# Patient Record
Sex: MALE | Race: ASIAN | Employment: UNEMPLOYED | URBAN - METROPOLITAN AREA
[De-identification: names, ages, dates, MRNs, and addresses within clinical notes are randomized per-mention and may not be internally consistent; named-entity substitution may affect disease eponyms.]

---

## 2020-07-04 ENCOUNTER — ANESTHESIA (OUTPATIENT)
Dept: OPERATING ROOM | Age: 20
DRG: 234 | End: 2020-07-04
Payer: COMMERCIAL

## 2020-07-04 ENCOUNTER — HOSPITAL ENCOUNTER (INPATIENT)
Age: 20
LOS: 1 days | Discharge: HOME | DRG: 234 | End: 2020-07-05
Attending: EMERGENCY MEDICINE | Admitting: SURGERY
Payer: COMMERCIAL

## 2020-07-04 ENCOUNTER — APPOINTMENT (OUTPATIENT)
Dept: CT IMAGING | Age: 20
DRG: 234 | End: 2020-07-04
Payer: COMMERCIAL

## 2020-07-04 ENCOUNTER — ANESTHESIA EVENT (OUTPATIENT)
Dept: OPERATING ROOM | Age: 20
DRG: 234 | End: 2020-07-04
Payer: COMMERCIAL

## 2020-07-04 VITALS
DIASTOLIC BLOOD PRESSURE: 52 MMHG | SYSTOLIC BLOOD PRESSURE: 110 MMHG | OXYGEN SATURATION: 100 % | RESPIRATION RATE: 19 BRPM

## 2020-07-04 PROBLEM — K35.80 ACUTE APPENDICITIS: Status: ACTIVE | Noted: 2020-07-04

## 2020-07-04 LAB
ALBUMIN SERPL-MCNC: 4.8 G/DL (ref 3.4–5)
ALP BLD-CCNC: 98 U/L (ref 40–129)
ALT SERPL-CCNC: 19 U/L (ref 10–40)
ANION GAP SERPL CALCULATED.3IONS-SCNC: 11 MMOL/L (ref 3–16)
AST SERPL-CCNC: 29 U/L (ref 15–37)
BASOPHILS ABSOLUTE: 0.1 K/UL (ref 0–0.2)
BASOPHILS RELATIVE PERCENT: 0.4 %
BILIRUB SERPL-MCNC: 0.5 MG/DL (ref 0–1)
BILIRUBIN DIRECT: <0.2 MG/DL (ref 0–0.3)
BILIRUBIN URINE: NEGATIVE
BILIRUBIN, INDIRECT: ABNORMAL MG/DL (ref 0–1)
BLOOD, URINE: NEGATIVE
BUN BLDV-MCNC: 16 MG/DL (ref 7–20)
CALCIUM SERPL-MCNC: 9.9 MG/DL (ref 8.3–10.6)
CHLORIDE BLD-SCNC: 99 MMOL/L (ref 99–110)
CLARITY: CLEAR
CO2: 29 MMOL/L (ref 21–32)
COLOR: YELLOW
CREAT SERPL-MCNC: 1 MG/DL (ref 0.9–1.3)
EOSINOPHILS ABSOLUTE: 0.1 K/UL (ref 0–0.6)
EOSINOPHILS RELATIVE PERCENT: 0.8 %
GFR AFRICAN AMERICAN: >60
GFR NON-AFRICAN AMERICAN: >60
GLUCOSE BLD-MCNC: 131 MG/DL (ref 70–99)
GLUCOSE URINE: NEGATIVE MG/DL
HCT VFR BLD CALC: 48.4 % (ref 40.5–52.5)
HEMOGLOBIN: 16.3 G/DL (ref 13.5–17.5)
KETONES, URINE: NEGATIVE MG/DL
LEUKOCYTE ESTERASE, URINE: NEGATIVE
LIPASE: 18 U/L (ref 13–60)
LYMPHOCYTES ABSOLUTE: 1.9 K/UL (ref 1–5.1)
LYMPHOCYTES RELATIVE PERCENT: 13.4 %
MCH RBC QN AUTO: 27.1 PG (ref 26–34)
MCHC RBC AUTO-ENTMCNC: 33.7 G/DL (ref 31–36)
MCV RBC AUTO: 80.4 FL (ref 80–100)
MICROSCOPIC EXAMINATION: NORMAL
MONOCYTES ABSOLUTE: 0.6 K/UL (ref 0–1.3)
MONOCYTES RELATIVE PERCENT: 4.4 %
NEUTROPHILS ABSOLUTE: 11.3 K/UL (ref 1.7–7.7)
NEUTROPHILS RELATIVE PERCENT: 81 %
NITRITE, URINE: NEGATIVE
PDW BLD-RTO: 13.4 % (ref 12.4–15.4)
PH UA: 6 (ref 5–8)
PLATELET # BLD: 280 K/UL (ref 135–450)
PMV BLD AUTO: 9.3 FL (ref 5–10.5)
POTASSIUM REFLEX MAGNESIUM: 4.3 MMOL/L (ref 3.5–5.1)
PROTEIN UA: NEGATIVE MG/DL
RBC # BLD: 6.01 M/UL (ref 4.2–5.9)
SODIUM BLD-SCNC: 139 MMOL/L (ref 136–145)
SPECIFIC GRAVITY UA: >1.03 (ref 1–1.03)
TOTAL PROTEIN: 8.8 G/DL (ref 6.4–8.2)
URINE TYPE: NORMAL
UROBILINOGEN, URINE: 1 E.U./DL
WBC # BLD: 13.9 K/UL (ref 4–11)

## 2020-07-04 PROCEDURE — 83690 ASSAY OF LIPASE: CPT

## 2020-07-04 PROCEDURE — 7100000001 HC PACU RECOVERY - ADDTL 15 MIN: Performed by: SURGERY

## 2020-07-04 PROCEDURE — 94760 N-INVAS EAR/PLS OXIMETRY 1: CPT

## 2020-07-04 PROCEDURE — 94150 VITAL CAPACITY TEST: CPT

## 2020-07-04 PROCEDURE — 3600000004 HC SURGERY LEVEL 4 BASE: Performed by: SURGERY

## 2020-07-04 PROCEDURE — 6370000000 HC RX 637 (ALT 250 FOR IP): Performed by: SURGERY

## 2020-07-04 PROCEDURE — 2580000003 HC RX 258: Performed by: ANESTHESIOLOGY

## 2020-07-04 PROCEDURE — 96375 TX/PRO/DX INJ NEW DRUG ADDON: CPT

## 2020-07-04 PROCEDURE — 6360000002 HC RX W HCPCS: Performed by: SURGERY

## 2020-07-04 PROCEDURE — 3600000014 HC SURGERY LEVEL 4 ADDTL 15MIN: Performed by: SURGERY

## 2020-07-04 PROCEDURE — 2720000010 HC SURG SUPPLY STERILE: Performed by: SURGERY

## 2020-07-04 PROCEDURE — 7100000000 HC PACU RECOVERY - FIRST 15 MIN: Performed by: SURGERY

## 2020-07-04 PROCEDURE — 3700000001 HC ADD 15 MINUTES (ANESTHESIA): Performed by: SURGERY

## 2020-07-04 PROCEDURE — 44970 LAPAROSCOPY APPENDECTOMY: CPT | Performed by: SURGERY

## 2020-07-04 PROCEDURE — 88304 TISSUE EXAM BY PATHOLOGIST: CPT

## 2020-07-04 PROCEDURE — 80048 BASIC METABOLIC PNL TOTAL CA: CPT

## 2020-07-04 PROCEDURE — 85025 COMPLETE CBC W/AUTO DIFF WBC: CPT

## 2020-07-04 PROCEDURE — 3700000000 HC ANESTHESIA ATTENDED CARE: Performed by: SURGERY

## 2020-07-04 PROCEDURE — 1200000000 HC SEMI PRIVATE

## 2020-07-04 PROCEDURE — 99285 EMERGENCY DEPT VISIT HI MDM: CPT

## 2020-07-04 PROCEDURE — 6360000002 HC RX W HCPCS: Performed by: ANESTHESIOLOGY

## 2020-07-04 PROCEDURE — 80076 HEPATIC FUNCTION PANEL: CPT

## 2020-07-04 PROCEDURE — 2709999900 HC NON-CHARGEABLE SUPPLY: Performed by: SURGERY

## 2020-07-04 PROCEDURE — 2580000003 HC RX 258: Performed by: SURGERY

## 2020-07-04 PROCEDURE — 96374 THER/PROPH/DIAG INJ IV PUSH: CPT

## 2020-07-04 PROCEDURE — 81003 URINALYSIS AUTO W/O SCOPE: CPT

## 2020-07-04 PROCEDURE — 2500000003 HC RX 250 WO HCPCS: Performed by: SURGERY

## 2020-07-04 PROCEDURE — 74176 CT ABD & PELVIS W/O CONTRAST: CPT

## 2020-07-04 PROCEDURE — 99222 1ST HOSP IP/OBS MODERATE 55: CPT | Performed by: SURGERY

## 2020-07-04 PROCEDURE — 2500000003 HC RX 250 WO HCPCS: Performed by: ANESTHESIOLOGY

## 2020-07-04 PROCEDURE — 6360000002 HC RX W HCPCS: Performed by: EMERGENCY MEDICINE

## 2020-07-04 RX ORDER — FENTANYL CITRATE 50 UG/ML
INJECTION, SOLUTION INTRAMUSCULAR; INTRAVENOUS PRN
Status: DISCONTINUED | OUTPATIENT
Start: 2020-07-04 | End: 2020-07-04 | Stop reason: SDUPTHER

## 2020-07-04 RX ORDER — SODIUM CHLORIDE 0.9 % (FLUSH) 0.9 %
10 SYRINGE (ML) INJECTION PRN
Status: DISCONTINUED | OUTPATIENT
Start: 2020-07-04 | End: 2020-07-05 | Stop reason: HOSPADM

## 2020-07-04 RX ORDER — ONDANSETRON 4 MG/1
4 TABLET, FILM COATED ORAL DAILY PRN
Qty: 30 TABLET | Refills: 0 | Status: SHIPPED | OUTPATIENT
Start: 2020-07-04

## 2020-07-04 RX ORDER — MORPHINE SULFATE 4 MG/ML
4 INJECTION, SOLUTION INTRAMUSCULAR; INTRAVENOUS
Status: DISCONTINUED | OUTPATIENT
Start: 2020-07-04 | End: 2020-07-04

## 2020-07-04 RX ORDER — LABETALOL HYDROCHLORIDE 5 MG/ML
5 INJECTION, SOLUTION INTRAVENOUS EVERY 10 MIN PRN
Status: DISCONTINUED | OUTPATIENT
Start: 2020-07-04 | End: 2020-07-04

## 2020-07-04 RX ORDER — HYDROMORPHONE HYDROCHLORIDE 1 MG/ML
0.5 INJECTION, SOLUTION INTRAMUSCULAR; INTRAVENOUS; SUBCUTANEOUS ONCE
Status: COMPLETED | OUTPATIENT
Start: 2020-07-04 | End: 2020-07-04

## 2020-07-04 RX ORDER — ONDANSETRON 2 MG/ML
4 INJECTION INTRAMUSCULAR; INTRAVENOUS EVERY 6 HOURS PRN
Status: DISCONTINUED | OUTPATIENT
Start: 2020-07-04 | End: 2020-07-04

## 2020-07-04 RX ORDER — BUPIVACAINE HYDROCHLORIDE AND EPINEPHRINE 5; 5 MG/ML; UG/ML
INJECTION, SOLUTION EPIDURAL; INTRACAUDAL; PERINEURAL
Status: COMPLETED | OUTPATIENT
Start: 2020-07-04 | End: 2020-07-04

## 2020-07-04 RX ORDER — DEXAMETHASONE SODIUM PHOSPHATE 10 MG/ML
INJECTION, SOLUTION INTRAMUSCULAR; INTRAVENOUS PRN
Status: DISCONTINUED | OUTPATIENT
Start: 2020-07-04 | End: 2020-07-04 | Stop reason: SDUPTHER

## 2020-07-04 RX ORDER — SODIUM CHLORIDE 0.9 % (FLUSH) 0.9 %
10 SYRINGE (ML) INJECTION EVERY 12 HOURS SCHEDULED
Status: DISCONTINUED | OUTPATIENT
Start: 2020-07-04 | End: 2020-07-05 | Stop reason: HOSPADM

## 2020-07-04 RX ORDER — ONDANSETRON 4 MG/1
4 TABLET, ORALLY DISINTEGRATING ORAL EVERY 8 HOURS PRN
Status: DISCONTINUED | OUTPATIENT
Start: 2020-07-04 | End: 2020-07-05 | Stop reason: HOSPADM

## 2020-07-04 RX ORDER — OXYCODONE HYDROCHLORIDE AND ACETAMINOPHEN 5; 325 MG/1; MG/1
1 TABLET ORAL
Status: DISCONTINUED | OUTPATIENT
Start: 2020-07-04 | End: 2020-07-04

## 2020-07-04 RX ORDER — ACETAMINOPHEN 325 MG/1
650 TABLET ORAL EVERY 6 HOURS
Status: DISCONTINUED | OUTPATIENT
Start: 2020-07-04 | End: 2020-07-04

## 2020-07-04 RX ORDER — OXYCODONE HYDROCHLORIDE 5 MG/1
10 TABLET ORAL EVERY 4 HOURS PRN
Status: DISCONTINUED | OUTPATIENT
Start: 2020-07-04 | End: 2020-07-05 | Stop reason: HOSPADM

## 2020-07-04 RX ORDER — OXYCODONE HYDROCHLORIDE 5 MG/1
10 TABLET ORAL EVERY 4 HOURS PRN
Status: DISCONTINUED | OUTPATIENT
Start: 2020-07-04 | End: 2020-07-04

## 2020-07-04 RX ORDER — SODIUM CHLORIDE 9 MG/ML
INJECTION, SOLUTION INTRAVENOUS CONTINUOUS
Status: DISCONTINUED | OUTPATIENT
Start: 2020-07-04 | End: 2020-07-05 | Stop reason: HOSPADM

## 2020-07-04 RX ORDER — ROCURONIUM BROMIDE 10 MG/ML
INJECTION, SOLUTION INTRAVENOUS PRN
Status: DISCONTINUED | OUTPATIENT
Start: 2020-07-04 | End: 2020-07-04 | Stop reason: SDUPTHER

## 2020-07-04 RX ORDER — ONDANSETRON 2 MG/ML
4 INJECTION INTRAMUSCULAR; INTRAVENOUS EVERY 6 HOURS PRN
Status: DISCONTINUED | OUTPATIENT
Start: 2020-07-04 | End: 2020-07-05 | Stop reason: HOSPADM

## 2020-07-04 RX ORDER — MORPHINE SULFATE 2 MG/ML
2 INJECTION, SOLUTION INTRAMUSCULAR; INTRAVENOUS
Status: DISCONTINUED | OUTPATIENT
Start: 2020-07-04 | End: 2020-07-04

## 2020-07-04 RX ORDER — PANTOPRAZOLE SODIUM 20 MG/1
20 TABLET, DELAYED RELEASE ORAL DAILY
Qty: 30 TABLET | Refills: 0 | Status: SHIPPED | OUTPATIENT
Start: 2020-07-04

## 2020-07-04 RX ORDER — DICYCLOMINE HYDROCHLORIDE 10 MG/1
10 CAPSULE ORAL 4 TIMES DAILY
Qty: 360 CAPSULE | Refills: 1 | Status: SHIPPED | OUTPATIENT
Start: 2020-07-04

## 2020-07-04 RX ORDER — MORPHINE SULFATE 2 MG/ML
2 INJECTION, SOLUTION INTRAMUSCULAR; INTRAVENOUS
Status: DISCONTINUED | OUTPATIENT
Start: 2020-07-04 | End: 2020-07-05 | Stop reason: HOSPADM

## 2020-07-04 RX ORDER — SODIUM CHLORIDE 9 MG/ML
INJECTION, SOLUTION INTRAVENOUS CONTINUOUS
Status: DISCONTINUED | OUTPATIENT
Start: 2020-07-04 | End: 2020-07-04

## 2020-07-04 RX ORDER — ONDANSETRON 2 MG/ML
INJECTION INTRAMUSCULAR; INTRAVENOUS PRN
Status: DISCONTINUED | OUTPATIENT
Start: 2020-07-04 | End: 2020-07-04 | Stop reason: SDUPTHER

## 2020-07-04 RX ORDER — SODIUM CHLORIDE 9 MG/ML
INJECTION, SOLUTION INTRAVENOUS CONTINUOUS PRN
Status: DISCONTINUED | OUTPATIENT
Start: 2020-07-04 | End: 2020-07-04 | Stop reason: SDUPTHER

## 2020-07-04 RX ORDER — ONDANSETRON 2 MG/ML
4 INJECTION INTRAMUSCULAR; INTRAVENOUS
Status: DISCONTINUED | OUTPATIENT
Start: 2020-07-04 | End: 2020-07-04

## 2020-07-04 RX ORDER — MEPERIDINE HYDROCHLORIDE 25 MG/ML
12.5 INJECTION INTRAMUSCULAR; INTRAVENOUS; SUBCUTANEOUS EVERY 5 MIN PRN
Status: DISCONTINUED | OUTPATIENT
Start: 2020-07-04 | End: 2020-07-04

## 2020-07-04 RX ORDER — OXYCODONE HYDROCHLORIDE 5 MG/1
5 TABLET ORAL EVERY 4 HOURS PRN
Status: DISCONTINUED | OUTPATIENT
Start: 2020-07-04 | End: 2020-07-04

## 2020-07-04 RX ORDER — MORPHINE SULFATE 2 MG/ML
4 INJECTION, SOLUTION INTRAMUSCULAR; INTRAVENOUS
Status: DISCONTINUED | OUTPATIENT
Start: 2020-07-04 | End: 2020-07-05 | Stop reason: HOSPADM

## 2020-07-04 RX ORDER — ACETAMINOPHEN 160 MG/5ML
650 SOLUTION ORAL EVERY 6 HOURS
Status: DISCONTINUED | OUTPATIENT
Start: 2020-07-04 | End: 2020-07-05 | Stop reason: HOSPADM

## 2020-07-04 RX ORDER — LIDOCAINE HYDROCHLORIDE 20 MG/ML
INJECTION, SOLUTION EPIDURAL; INFILTRATION; INTRACAUDAL; PERINEURAL PRN
Status: DISCONTINUED | OUTPATIENT
Start: 2020-07-04 | End: 2020-07-04 | Stop reason: SDUPTHER

## 2020-07-04 RX ORDER — HYDRALAZINE HYDROCHLORIDE 20 MG/ML
5 INJECTION INTRAMUSCULAR; INTRAVENOUS EVERY 10 MIN PRN
Status: DISCONTINUED | OUTPATIENT
Start: 2020-07-04 | End: 2020-07-04

## 2020-07-04 RX ORDER — MAGNESIUM HYDROXIDE 1200 MG/15ML
LIQUID ORAL CONTINUOUS PRN
Status: COMPLETED | OUTPATIENT
Start: 2020-07-04 | End: 2020-07-04

## 2020-07-04 RX ORDER — PROPOFOL 10 MG/ML
INJECTION, EMULSION INTRAVENOUS PRN
Status: DISCONTINUED | OUTPATIENT
Start: 2020-07-04 | End: 2020-07-04 | Stop reason: SDUPTHER

## 2020-07-04 RX ORDER — OXYCODONE HYDROCHLORIDE 5 MG/1
5 TABLET ORAL EVERY 4 HOURS PRN
Status: DISCONTINUED | OUTPATIENT
Start: 2020-07-04 | End: 2020-07-05 | Stop reason: HOSPADM

## 2020-07-04 RX ORDER — ACETAMINOPHEN 325 MG/1
650 TABLET ORAL EVERY 4 HOURS PRN
Status: DISCONTINUED | OUTPATIENT
Start: 2020-07-04 | End: 2020-07-05 | Stop reason: HOSPADM

## 2020-07-04 RX ORDER — HYDROMORPHONE HCL 110MG/55ML
0.5 PATIENT CONTROLLED ANALGESIA SYRINGE INTRAVENOUS EVERY 5 MIN PRN
Status: DISCONTINUED | OUTPATIENT
Start: 2020-07-04 | End: 2020-07-04

## 2020-07-04 RX ORDER — SUCRALFATE 1 G/1
1 TABLET ORAL 4 TIMES DAILY
Qty: 120 TABLET | Refills: 3 | Status: SHIPPED | OUTPATIENT
Start: 2020-07-04

## 2020-07-04 RX ADMIN — HYDROMORPHONE HYDROCHLORIDE 0.5 MG: 1 INJECTION, SOLUTION INTRAMUSCULAR; INTRAVENOUS; SUBCUTANEOUS at 05:57

## 2020-07-04 RX ADMIN — LIDOCAINE HYDROCHLORIDE 50 MG: 20 INJECTION, SOLUTION EPIDURAL; INFILTRATION; INTRACAUDAL; PERINEURAL at 12:34

## 2020-07-04 RX ADMIN — PIPERACILLIN AND TAZOBACTAM 3.38 G: 3; .375 INJECTION, POWDER, LYOPHILIZED, FOR SOLUTION INTRAVENOUS at 12:34

## 2020-07-04 RX ADMIN — FENTANYL CITRATE 100 MCG: 50 INJECTION, SOLUTION INTRAMUSCULAR; INTRAVENOUS at 12:34

## 2020-07-04 RX ADMIN — ONDANSETRON 4 MG: 2 INJECTION INTRAMUSCULAR; INTRAVENOUS at 12:34

## 2020-07-04 RX ADMIN — SODIUM CHLORIDE: 9 INJECTION, SOLUTION INTRAVENOUS at 12:34

## 2020-07-04 RX ADMIN — SODIUM CHLORIDE: 9 INJECTION, SOLUTION INTRAVENOUS at 15:06

## 2020-07-04 RX ADMIN — PIPERACILLIN AND TAZOBACTAM 3.38 G: 3; .375 INJECTION, POWDER, LYOPHILIZED, FOR SOLUTION INTRAVENOUS at 19:42

## 2020-07-04 RX ADMIN — Medication 10 ML: at 08:52

## 2020-07-04 RX ADMIN — ROCURONIUM BROMIDE 50 MG: 10 INJECTION, SOLUTION INTRAVENOUS at 12:34

## 2020-07-04 RX ADMIN — PIPERACILLIN AND TAZOBACTAM 3.38 G: 3; .375 INJECTION, POWDER, LYOPHILIZED, FOR SOLUTION INTRAVENOUS at 08:51

## 2020-07-04 RX ADMIN — PROPOFOL 200 MG: 10 INJECTION, EMULSION INTRAVENOUS at 12:34

## 2020-07-04 RX ADMIN — ACETAMINOPHEN ORAL SOLUTION 650 MG: 650 SOLUTION ORAL at 20:25

## 2020-07-04 RX ADMIN — SUGAMMADEX 200 MG: 100 INJECTION, SOLUTION INTRAVENOUS at 13:19

## 2020-07-04 RX ADMIN — SODIUM CHLORIDE: 9 INJECTION, SOLUTION INTRAVENOUS at 08:51

## 2020-07-04 RX ADMIN — FENTANYL CITRATE 100 MCG: 50 INJECTION, SOLUTION INTRAMUSCULAR; INTRAVENOUS at 13:01

## 2020-07-04 RX ADMIN — DEXAMETHASONE SODIUM PHOSPHATE 8 MG: 10 INJECTION, SOLUTION INTRAMUSCULAR; INTRAVENOUS at 12:34

## 2020-07-04 RX ADMIN — ENOXAPARIN SODIUM 40 MG: 40 INJECTION SUBCUTANEOUS at 19:41

## 2020-07-04 RX ADMIN — ONDANSETRON 4 MG: 2 INJECTION INTRAMUSCULAR; INTRAVENOUS at 05:56

## 2020-07-04 SDOH — HEALTH STABILITY: MENTAL HEALTH: HOW OFTEN DO YOU HAVE A DRINK CONTAINING ALCOHOL?: NEVER

## 2020-07-04 ASSESSMENT — PULMONARY FUNCTION TESTS
PIF_VALUE: 1
PIF_VALUE: 32
PIF_VALUE: 26
PIF_VALUE: 36
PIF_VALUE: 27
PIF_VALUE: 25
PIF_VALUE: 25
PIF_VALUE: 29
PIF_VALUE: 27
PIF_VALUE: 28
PIF_VALUE: 34
PIF_VALUE: 1
PIF_VALUE: 26
PIF_VALUE: 34
PIF_VALUE: 27
PIF_VALUE: 34
PIF_VALUE: 34
PIF_VALUE: 25
PIF_VALUE: 35
PIF_VALUE: 25
PIF_VALUE: 15
PIF_VALUE: 26
PIF_VALUE: 34
PIF_VALUE: 25
PIF_VALUE: 34
PIF_VALUE: 35
PIF_VALUE: 27
PIF_VALUE: 35
PIF_VALUE: 29
PIF_VALUE: 25
PIF_VALUE: 33
PIF_VALUE: 3
PIF_VALUE: 28
PIF_VALUE: 2
PIF_VALUE: 27
PIF_VALUE: 36
PIF_VALUE: 26
PIF_VALUE: 1
PIF_VALUE: 2
PIF_VALUE: 27
PIF_VALUE: 1
PIF_VALUE: 27
PIF_VALUE: 28
PIF_VALUE: 1
PIF_VALUE: 0
PIF_VALUE: 27
PIF_VALUE: 34
PIF_VALUE: 19
PIF_VALUE: 27
PIF_VALUE: 35
PIF_VALUE: 27
PIF_VALUE: 27
PIF_VALUE: 34
PIF_VALUE: 26

## 2020-07-04 ASSESSMENT — PAIN SCALES - GENERAL
PAINLEVEL_OUTOF10: 0
PAINLEVEL_OUTOF10: 6
PAINLEVEL_OUTOF10: 0
PAINLEVEL_OUTOF10: 2
PAINLEVEL_OUTOF10: 0

## 2020-07-04 ASSESSMENT — PAIN SCALES - WONG BAKER: WONGBAKER_NUMERICALRESPONSE: 0

## 2020-07-04 NOTE — ED PROVIDER NOTES
Emergency Department Encounter    Patient: Wagner Lindo  MRN: [id number]  : 2000  Date of Evaluation: 2020  ED Provider:  Dwayne Cash    Triage Chief Complaint:   Abdominal Pain (Pt presents to the ED with left sided abdominal pain. Pt reports N/V, Appendix and gall bladder still intact. Denies stomach surgeries. Denies hematuria )    [place]:  Wagner Lindo is a 22 y.o. male that presents to the ER for evaluation of epigastric abdominal pain and left flank pain no active chest pain pressure acute shortness of breath. Vomiting x1-2 today. No rectal bleeding. Afebrile. No abnormal smell or taste. No COVID-19 exposures. No alleviating factors. No home therapy. No prior abdominal surgeries. ROS - see HPI, below listed is current ROS at time of my eval:  General:  No fevers, no chills, no weakness  Eyes:  no discharge  ENT:  No sore throat, no nasal congestion  Cardiovascular:  No chest pain, no palpitations  Respiratory:  No shortness of breath, no cough, no wheezing  Gastrointestinal:  + pain, + nausea,+ vomiting, no diarrhea  Musculoskeletal:  No muscle pain, no joint pain  Skin:  No rash, no pruritis  Neurologic:  no headache  Genitourinary:  No dysuria, no hematuria  Endocrine:  No unexpected weight gain, no unexpected weight loss  Extremities:  no edema, no pain    History reviewed. No pertinent past medical history. History reviewed. No pertinent surgical history. History reviewed. No pertinent family history.   Social History     Socioeconomic History    Marital status: Single     Spouse name: Not on file    Number of children: Not on file    Years of education: Not on file    Highest education level: Not on file   Occupational History    Not on file   Social Needs    Financial resource strain: Not on file    Food insecurity     Worry: Not on file     Inability: Not on file    Transportation needs     Medical: Not on file     Non-medical: Not on file   Tobacco Use    Smoking status: Never Smoker    Smokeless tobacco: Never Used   Substance and Sexual Activity    Alcohol use: Never     Frequency: Never    Drug use: Never    Sexual activity: Not on file   Lifestyle    Physical activity     Days per week: Not on file     Minutes per session: Not on file    Stress: Not on file   Relationships    Social connections     Talks on phone: Not on file     Gets together: Not on file     Attends [employer] service: Not on file     Active member of club or organization: Not on file     Attends meetings of clubs or organizations: Not on file     Relationship status: Not on file    Intimate partner violence     Fear of current or ex partner: Not on file     Emotionally abused: Not on file     Physically abused: Not on file     Forced sexual activity: Not on file   Other Topics Concern    Not on file   Social History Narrative    Not on file     Current Facility-Administered Medications   Medication Dose Route Frequency Provider Last Rate Last Dose    HYDROmorphone HCl PF (DILAUDID) injection 0.5 mg  0.5 mg Intravenous Once Adamaris Morse MD        ondansetron [facility]) injection 4 mg  4 mg Intravenous B9V PRN Adamaris Morse MD         Current Outpatient Medications   Medication Sig Dispense Refill    pantoprazole (PROTONIX) 20 MG tablet Take 1 tablet by mouth daily 30 tablet 0    dicyclomine (BENTYL) 10 MG capsule Take 1 capsule by mouth 4 times daily 360 capsule 1    ondansetron (ZOFRAN) 4 MG tablet Take 1 tablet by mouth daily as needed for Nausea or Vomiting 30 tablet 0    sucralfate (CARAFATE) 1 GM tablet Take 1 tablet by mouth 4 times daily 120 tablet 3     No Known Allergies    Nursing Notes Reviewed    Physical Exam:  Triage VS:    ED Triage Vitals   Enc Vitals Group      BP 07/04/20 0441 (!) 162/80      Pulse 07/04/20 0439 64      Resp 07/04/20 0439 16      Temp 07/04/20 0439 97.8 °F (36.6 °C)      Temp Source 07/04/20 0439 Oral      SpO2 07/04/20 0439 99 % Weight 07/04/20 0439 280 lb (127 kg)      Height 07/04/20 0439 5' 11\" (1.803 m)      Head Circumference --       Peak Flow --       Pain Score --       Pain Loc --       Pain Edu? --       Excl. in [address]? --        My pulse ox interpretation is - normal    General appearance:  No acute distress. Skin:  Warm. Dry. No rash. Neck:  Trachea midline. Heart:  Regular rate and rhythm, normal S1 & S2.    Perfusion:  intact  Respiratory:  Lungs clear to auscultation bilaterally. Respirations nonlabored. Abdominal:  diffuse tenderness to palpation, no rebound guarding or rigidity, neg Key's sign, neg McBurney's point tenderness to palpation, normal bowel sounds, no masses, no organomegaly, no pulsatile masses  Back:  No CVA TTP  Extremity:    normal ROM   Neurological:  Alert and oriented times 3.       I have reviewed and interpreted all of the currently available lab results from this visit (if applicable):  Results for orders placed or performed during the hospital encounter of 07/04/20   CBC Auto Differential   Result Value Ref Range    WBC 13.9 (H) 4.0 - 11.0 K/uL    RBC 6.01 (H) 4.20 - 5.90 M/uL    Hemoglobin 16.3 13.5 - 17.5 g/dL    Hematocrit 48.4 40.5 - 52.5 %    MCV 80.4 80.0 - 100.0 fL    MCH 27.1 26.0 - 34.0 pg    MCHC 33.7 31.0 - 36.0 g/dL    RDW 13.4 12.4 - 15.4 %    Platelets 734 144 - 884 K/uL    MPV 9.3 5.0 - 10.5 fL    Neutrophils % 81.0 %    Lymphocytes % 13.4 %    Monocytes % 4.4 %    Eosinophils % 0.8 %    Basophils % 0.4 %    Neutrophils Absolute 11.3 (H) 1.7 - 7.7 K/uL    Lymphocytes Absolute 1.9 1.0 - 5.1 K/uL    Monocytes Absolute 0.6 0.0 - 1.3 K/uL    Eosinophils Absolute 0.1 0.0 - 0.6 K/uL    Basophils Absolute 0.1 0.0 - 0.2 K/uL   Basic Metabolic Panel w/ Reflex to MG   Result Value Ref Range    Sodium 139 136 - 145 mmol/L    Potassium reflex Magnesium 4.3 3.5 - 5.1 mmol/L    Chloride 99 99 - 110 mmol/L    CO2 29 21 - 32 mmol/L    Anion Gap 11 3 - 16    Glucose 131 (H) 70 - 99 mg/dL TABLET    Take 1 tablet by mouth daily as needed for Nausea or Vomiting    PANTOPRAZOLE (PROTONIX) 20 MG TABLET    Take 1 tablet by mouth daily    SUCRALFATE (CARAFATE) 1 GM TABLET    Take 1 tablet by mouth 4 times daily       Comment: Please note this report has been produced using speech recognition software and may contain errors related to that system including errors in grammar, punctuation, and spelling, as well as words and phrases that may be inappropriate. Efforts were made to edit the dictations.       Evan Peoples MD  43/82/59 [phone]

## 2020-07-04 NOTE — PLAN OF CARE
Problem: Infection:  Goal: Will remain free from infection  Description: Will remain free from infection  Outcome: Ongoing     Problem: Safety:  Goal: Free from accidental physical injury  Description: Free from accidental physical injury  Outcome: Ongoing     Problem: Daily Care:  Goal: Daily care needs are met  Description: Daily care needs are met  Outcome: Ongoing     Problem: Pain:  Goal: Patient's pain/discomfort is manageable  Description: Patient's pain/discomfort is manageable  Outcome: Ongoing     Problem: Skin Integrity:  Goal: Skin integrity will stabilize  Description: Skin integrity will stabilize  Outcome: Ongoing     Problem: Infection - Surgical Site:  Goal: Will show no infection signs and symptoms  Description: Will show no infection signs and symptoms  Outcome: Ongoing

## 2020-07-04 NOTE — PROGRESS NOTES
Patient ambulating to BR independently and tolerating general diet well. Patient only has some discomfort to umbilical surgical incision site. No pain med needed at this time. Patient encouraged to ambulate in halls.

## 2020-07-04 NOTE — BRIEF OP NOTE
[facility] and Laparoscopic Surgery  Brief Operative Note  Pt Name: Ria Myers  CSN: [id number]  YOB: 2000    Date of Procedure: 7/4/2020    Pre-operative Diagnosis: Acute appendicitis    Post-operative Diagnosis: same     Procedure: Laparoscopic appendectomy    Surgeon(s):  Aurelio Campa MD     Anesthesia:  General anesthesia    Findings: Full note dictated, Dictation Job Number: [id number]    Estimated Blood Loss: less than 50  ml    Complications: None    Specimens: appendix  ID Type Source Tests Collected by Time Destination   A : appendix  Tissue Appendix SURGICAL PATHOLOGY Aurelio Campa MD 7/4/2020 1249         Implants:  * No implants in log *    Drains: none   * No LDAs found *    Condition: stable     Disposition and Post-operative plan: PACU, med/surg perez     Amanuel Culp MD, FACS  7/4/2020  1:05 PM

## 2020-07-04 NOTE — H&P
[facility] and Laparoscopic Surgery     History and Physical                                                     Patient Name: Wagner Lindo  MRN: [id number]  [place]: 2000  Admission date: 7/4/2020  4:35 AM   Date of evaluation: 7/4/2020  Primary Care Physician: No primary care provider on file. Reason for admission: Abdominal pain  History of Present Illness:    Mr. Jil Joyce is a 22 y.o. male who presents with acute onset sharp periumbilical pain that migrated diffusely worse in right lower quadrant. Constant, progressively worse, never happened before. No unusual ingestions, sick contacts, travel history. Nothing makes better or worse. Associated symptoms include nausea and vomiting. Denies fevers, chills, chest pain, dyspnea, cough, change in bowel movements, dysuria, other complaints. No other medical conditions. No abdominal surgical history. Workup in ED consistent with acute appendicitis. Surgery consulted for admission and appendectomy    Past Medical History:    History reviewed. No pertinent past medical history. Past Surgical History:    History reviewed. No pertinent surgical history. Scheduled Meds:   sodium chloride flush  10 mL Intravenous 2 times per day    enoxaparin  40 mg Subcutaneous Daily    piperacillin-tazobactam  3.375 g Intravenous Q8H     Continuous Infusions:   sodium chloride 125 mL/hr at 07/04/20 0851     PRN Meds:.sodium chloride flush, ondansetron, morphine **OR** morphine, oxyCODONE **OR** oxyCODONE, acetaminophen, HYDROmorphone, oxyCODONE-acetaminophen, ondansetron, labetalol, hydrALAZINE, meperidine    Prior to Admission medications    Medication Sig Start Date End Date Taking?  Authorizing Provider   pantoprazole (PROTONIX) 20 MG tablet Take 1 tablet by mouth daily 3/0/88  Yes Wilford Bence, MD   dicyclomine (BENTYL) 10 MG capsule Take 1 capsule by mouth 4 times daily 5/3/25  Yes Wilford Bence, MD   ondansetron (ZOFRAN) 4 MG tablet 16 100 % -- --   20 0908 -- -- -- -- -- 98 % -- --   20 0815 139/74 97.9 °F (36.6 °C) Oral 57 16 97 % -- --   20 0748 (!) 149/77 98.5 °F (36.9 °C) Oral 57 16 97 % -- --   20 0441 (!) 162/80 -- -- -- -- -- -- --   20 0439 -- 97.8 °F (36.6 °C) Oral 64 16 99 % 5' 11\" (1.803 m) 280 lb (127 kg)      TEMPERATURE HISTORY 24H: Temp (24hrs), Av.8 °F (36.6 °C), Min:96.8 °F (36 °C), Max:98.5 °F (36.9 °C)    BLOOD PRESSURE HISTORY: Systolic (98LUE), JXU:875 , Min:139 , HUM:554    Diastolic (58OTG), JBA:91, Min:74, Max:83    Admission Weight: 280 lb (127 kg)     No intake or output data in the 24 hours ending 20 1112  Drain/tube Output:         Physical Exam:  CONSTITUTIONAL:  alert, no apparent distress   NEUROLOGIC:  Mental Status Exam:  Level of Alertness:   awake  Orientation:  Oriented to person, place, time  EYES:  sclera clear  HENT:  normocepalic, without obvious abnormality  NECK:  supple, symmetrical, trachea midline  LUNGS:  clear to auscultation  CARDIOVASCULAR:  regular rate and rhythm   ABDOMEN: soft, non-distended, diffuse abdominal tenderness worse in right lower quadrant with focal peritonitis  SKIN:  no bruising or bleeding, normal skin color, texture, turgor and no redness, warmth, or swelling      Labs:    CBC:    Recent Labs     20   WBC 13.9*   HGB 16.3   HCT 48.4        BMP:    Recent Labs     20      K 4.3   CL 99   CO2 29   BUN 16   CREATININE 1.0   GLUCOSE 131*     Hepatic:   Recent Labs     20   AST 29   ALT 19   BILITOT 0.5   ALKPHOS 98     Amylase: No results for input(s): AMYLASE in the last 72 hours. Lipase:   Recent Labs     20  0459   LIPASE 18.0     Mag:    No results for input(s): MG in the last 72 hours. Phos:   No results for input(s): PHOS in the last 72 hours. Coags: No results for input(s): INR, APTT in the last 72 hours.     Imaging:  I have personally reviewed the following films:    Ct Abdomen Pelvis Wo Contrast Additional Contrast? None    Result Date: 7/4/2020  EXAMINATION: CT OF THE ABDOMEN AND PELVIS WITHOUT CONTRAST 7/4/2020 4:48 am TECHNIQUE: CT of the abdomen and pelvis was performed without the administration of intravenous contrast. Multiplanar reformatted images are provided for review. Dose modulation, iterative reconstruction, and/or weight based adjustment of the mA/kV was utilized to reduce the radiation dose to as low as reasonably achievable. COMPARISON: None. HISTORY: ORDERING SYSTEM PROVIDED HISTORY: luq/left flank pain ro stone left TECHNOLOGIST PROVIDED HISTORY: Reason for exam:->luq/left flank pain ro stone left Additional Contrast?->None Reason for Exam: Abdominal Pain (Pt presents to the ED with left sided abdominal pain. Pt reports N/V, Appendix and gall bladder still intact. Denies stomach surgeries. Denies hematuria ) Acuity: Acute Type of Exam: Initial FINDINGS: Lower Chest: Visualized lung bases are clear without focal airspace consolidation, sizeable effusion, or pneumothorax. No definite pulmonary nodules or masses. Base of the heart is normal in size without pericardial fluid collection. Organs: The liver, gallbladder, pancreas, and spleen are grossly within normal limits. No evidence for intrahepatic or extrahepatic biliary ductal dilatation. GI/Bowel: There is no evidence for bowel obstruction or inflammation. No free intraperitoneal air or fluid. Small bowel loops are normal in caliber. No evidence for hiatal hernia. Appendix is prominent with multiple intraluminal calculi although no definite periappendiceal inflammation identified. . Pelvis: No evidence for free fluid. Peritoneum/Retroperitoneum: Adrenal glands are normal in size and configuration. The kidneys are normal in size without definite nephrolithiasis or hydronephrosis. The ureters run a nonobstructed course to a normal-appearing urinary bladder. Vasculature: The aorta is normal in caliber.   No definite lymphadenopathy identified. Bones/Soft Tissues: No evidence for acute fracture or dislocation. No lytic or blastic lesions. No evidence for acute intra-abdominal or intrapelvic pathology. No bowel obstruction or inflammation. No evidence for nephrolithiasis or urinary obstruction. The appendix is distended with multiple intraluminal calculi. Findings may predispose to appendicitis, however there is no periappendiceal inflammation to suggest acute appendicitis. .       Cultures:  Relevant cultures documented under results section     Assessment:  Patient Active Problem List   Diagnosis    Acute appendicitis     Acute appendicitis with focal peritonitis    Plan:  1. The patient has a history, exam, and CT confirming appendicitis. Specific risks discussed include bleeding, infection, damage to surrounding structures, conversion to open, possible requirement of additional procedures, as well as the perioperative risks associated with general anesthesia. Benefits include resolution of symptoms and prevention of future complications related to appendicitis. Alternatives include observation with medical management. Details of the procedure were discussed and all questions answered. The patient understands, agrees, and wishes to proceed. He will be taken to the OR this morning  2. NPO for procedure  3. IVF resuscitation  4. Antibiotics  5. Pain medication and antiemetics as needed  6. If non-perforated appendix encountered and patient's symptoms improve, could potentially be discharged tomorrow. Otherwise will need continued hospital admission for pain control and antibiotics  7. The patient was counseled at length about the risks of brandt Covid-19 in the deondre-operative and post-operative states including the recovery window of their procedure.   The patient was made aware that brandt Covid-19 after a surgical procedure may worsen their prognosis for recovering from the virus and lend to a higher morbidity and or mortality risk. The patient was given the options of postponing their procedure. All of the risks, benefits, and alternatives of this were discussed. The patient does wish to proceed with the procedure. This plan was discussed at length with the patient. He was understanding and in agreement with the plan    Amanuel Forde MD, FACS  7/4/2020  11:12 AM

## 2020-07-04 NOTE — ANESTHESIA POSTPROCEDURE EVALUATION
Department of Anesthesiology  Postprocedure Note    Patient: Mega Lopez  MRN: [id number]  YOB: 2000  Date of evaluation: 7/4/2020  Time:  1:29 PM     Procedure Summary     Date:  07/04/20 Room / Location:  [address] [place] [address]    Anesthesia Start:  1234 Anesthesia Stop:  4393    Procedure:  APPENDECTOMY LAPAROSCOPIC (N/A Abdomen) Diagnosis:  (appendicitis)    Surgeon:  Angeli Killian MD Responsible Provider:  Rakesh De Los Santos MD    Anesthesia Type:  general ASA Status:  2 - Emergent          Anesthesia Type: general    Becka Phase I:      Becka Phase II:      Last vitals: Reviewed and per EMR flowsheets.        Anesthesia Post Evaluation    Patient location during evaluation: PACU  Patient participation: complete - patient participated  Level of consciousness: awake  Airway patency: patent  Nausea & Vomiting: no nausea and no vomiting  Complications: no  Cardiovascular status: blood pressure returned to baseline  Respiratory status: acceptable  Hydration status: euvolemic

## 2020-07-04 NOTE — ANESTHESIA PRE PROCEDURE
Department of Anesthesiology  Preprocedure Note       Name:  Eliu Damian   Age:  22 y.o.  :  2000                                          MRN:  [id number]         Date:  2020      Surgeon: Bishop Almon[place]):  Caitlin Chris MD    Procedure: Procedure(s):  APPENDECTOMY LAPAROSCOPIC    Medications prior to admission:   Prior to Admission medications    Medication Sig Start Date End Date Taking?  Authorizing Provider   pantoprazole (PROTONIX) 20 MG tablet Take 1 tablet by mouth daily 38  Yes Isabelle Green MD   dicyclomine (BENTYL) 10 MG capsule Take 1 capsule by mouth 4 times daily   Yes Isabelle Green MD   ondansetron [facility]) 4 MG tablet Take 1 tablet by mouth daily as needed for Nausea or Vomiting 55  Yes Isabelle Green MD   sucralfate (CARAFATE) 1 GM tablet Take 1 tablet by mouth 4 times daily   Yes Isabelle Green MD       Current medications:    Current Facility-Administered Medications   Medication Dose Route Frequency Provider Last Rate Last Dose    sodium chloride flush 0.9 % injection 10 mL  10 mL Intravenous 2 times per day Caitlin Chris MD   10 mL at 20 0852    sodium chloride flush 0.9 % injection 10 mL  10 mL Intravenous PRN Caitlin Chris MD        enoxaparin (LOVENOX) injection 40 mg  40 mg Subcutaneous Daily Caitlin Chris MD        ondansetron [facility]) injection 4 mg  4 mg Intravenous Q6H PRN Caitlin Chris MD        piperacillin-tazobactam (ZOSYN) 3.375 g in dextrose 5 % 50 mL IVPB extended infusion (mini-bag)  3.375 g Intravenous Pritesh Mercado MD 12.5 mL/hr at 20 0851 3.375 g at 20 0851    0.9 % sodium chloride infusion   Intravenous Continuous Caitlin Chris  mL/hr at 20 0851      morphine (PF) injection 2 mg  2 mg Intravenous Q2H PRN Caitlin Chris MD        Or   Deborah Stephenson morphine injection 4 mg  4 mg Intravenous Q2H PRN Caitlin Chris MD        oxyCODONE (ROXICODONE) immediate release tablet 5 mg  5 mg Oral Q4H PRN Ayo Wiseman MD        Or    oxyCODONE (ROXICODONE) immediate release tablet 10 mg  10 mg Oral Q4H PRN Ayo Wiseman MD        acetaminophen (TYLENOL) tablet 650 mg  650 mg Oral Q4H PRN Ayo Wiseman MD           Allergies:  No Known Allergies    Problem List:    Patient Active Problem List   Diagnosis Code    Acute appendicitis K35.80       Past Medical History:  History reviewed. No pertinent past medical history. Past Surgical History:  History reviewed. No pertinent surgical history. Social History:    Social History     Tobacco Use    Smoking status: Never Smoker    Smokeless tobacco: Never Used   Substance Use Topics    Alcohol use: Never     Frequency: Never                                Counseling given: Not Answered      Vital Signs (Current):   Vitals:    07/04/20 0441 07/04/20 0748 07/04/20 0815 07/04/20 0908   BP: (!) 162/80 (!) 149/77 139/74    Pulse:  57 57    Resp:  16 16    Temp:  98.5 °F (36.9 °C) 97.9 °F (36.6 °C)    TempSrc:  Oral Oral    SpO2:  97% 97% 98%   Weight:       Height:                                                  BP Readings from Last 3 Encounters:   07/04/20 139/74       NPO Status:                                                                                 BMI:   Wt Readings from Last 3 Encounters:   07/04/20 280 lb (127 kg)     Body mass index is 39.05 kg/m².     CBC:   Lab Results   Component Value Date    WBC 13.9 07/04/2020    RBC 6.01 07/04/2020    HGB 16.3 07/04/2020    HCT 48.4 07/04/2020    MCV 80.4 07/04/2020    RDW 13.4 07/04/2020     07/04/2020       CMP:   Lab Results   Component Value Date     07/04/2020    K 4.3 07/04/2020    CL 99 07/04/2020    CO2 29 07/04/2020    BUN 16 07/04/2020    CREATININE 1.0 07/04/2020    GFRAA >60 07/04/2020    LABGLOM >60 07/04/2020    GLUCOSE 131 07/04/2020    PROT 8.8 07/04/2020    CALCIUM 9.9 07/04/2020    BILITOT 0.5 07/04/2020    ALKPHOS 98 07/04/2020    AST 29 07/04/2020    ALT 19 07/04/2020       POC Tests: No results for input(s): POCGLU, POCNA, POCK, POCCL, POCBUN, POCHEMO, POCHCT in the last 72 hours. Coags: No results found for: PROTIME, INR, APTT    HCG (If Applicable): No results found for: PREGTESTUR, PREGSERUM, HCG, HCGQUANT     ABGs: No results found for: PHART, PO2ART, UAF1QFC, GNR0DYI, BEART, F6NBINHN     Type & Screen (If Applicable):  No results found for: LABABO, LABRH    Drug/Infectious Status (If Applicable):  No results found for: HIV, HEPCAB    COVID-19 Screening (If Applicable): No results found for: COVID19      Anesthesia Evaluation  Patient summary reviewed and Nursing notes reviewed  Airway: Mallampati: II        Dental: normal exam         Pulmonary:normal exam                               Cardiovascular:  Exercise tolerance: good (>4 METS),                     Neuro/Psych:               GI/Hepatic/Renal:   (+) GERD:,           Endo/Other:                     Abdominal:           Vascular:                                        Anesthesia Plan      general     ASA 2 - emergent       Induction: intravenous and rapid sequence. MIPS: Postoperative opioids intended, Prophylactic antiemetics administered and Postoperative trial extubation. Anesthetic plan and risks discussed with patient. Use of blood products discussed with patient whom consented to blood products.                    Inés Swartz MD   7/4/2020

## 2020-07-04 NOTE — ED NOTES
Report to [facility] on 4t,  Pt transferred with belongings, pt is clean, dry and in a pt gown.        Darreld MATTHEW Moya  07/04/20 3259

## 2020-07-05 VITALS
RESPIRATION RATE: 16 BRPM | OXYGEN SATURATION: 98 % | BODY MASS INDEX: 39.2 KG/M2 | SYSTOLIC BLOOD PRESSURE: 119 MMHG | WEIGHT: 280 LBS | DIASTOLIC BLOOD PRESSURE: 70 MMHG | TEMPERATURE: 97.5 F | HEART RATE: 44 BPM | HEIGHT: 71 IN

## 2020-07-05 LAB
ANION GAP SERPL CALCULATED.3IONS-SCNC: 10 MMOL/L (ref 3–16)
BASOPHILS ABSOLUTE: 0 K/UL (ref 0–0.2)
BASOPHILS RELATIVE PERCENT: 0.3 %
BUN BLDV-MCNC: 14 MG/DL (ref 7–20)
CALCIUM SERPL-MCNC: 9.2 MG/DL (ref 8.3–10.6)
CHLORIDE BLD-SCNC: 101 MMOL/L (ref 99–110)
CO2: 25 MMOL/L (ref 21–32)
CREAT SERPL-MCNC: 0.8 MG/DL (ref 0.9–1.3)
EOSINOPHILS ABSOLUTE: 0 K/UL (ref 0–0.6)
EOSINOPHILS RELATIVE PERCENT: 0.1 %
GFR AFRICAN AMERICAN: >60
GFR NON-AFRICAN AMERICAN: >60
GLUCOSE BLD-MCNC: 120 MG/DL (ref 70–99)
HCT VFR BLD CALC: 41.2 % (ref 40.5–52.5)
HEMOGLOBIN: 14.5 G/DL (ref 13.5–17.5)
LYMPHOCYTES ABSOLUTE: 1.7 K/UL (ref 1–5.1)
LYMPHOCYTES RELATIVE PERCENT: 15.7 %
MCH RBC QN AUTO: 27.9 PG (ref 26–34)
MCHC RBC AUTO-ENTMCNC: 35.2 G/DL (ref 31–36)
MCV RBC AUTO: 79.3 FL (ref 80–100)
MONOCYTES ABSOLUTE: 0.8 K/UL (ref 0–1.3)
MONOCYTES RELATIVE PERCENT: 7.6 %
NEUTROPHILS ABSOLUTE: 8.2 K/UL (ref 1.7–7.7)
NEUTROPHILS RELATIVE PERCENT: 76.3 %
PDW BLD-RTO: 13.5 % (ref 12.4–15.4)
PLATELET # BLD: 249 K/UL (ref 135–450)
PMV BLD AUTO: 9 FL (ref 5–10.5)
POTASSIUM SERPL-SCNC: 4.2 MMOL/L (ref 3.5–5.1)
RBC # BLD: 5.2 M/UL (ref 4.2–5.9)
SODIUM BLD-SCNC: 136 MMOL/L (ref 136–145)
WBC # BLD: 10.7 K/UL (ref 4–11)

## 2020-07-05 PROCEDURE — 85025 COMPLETE CBC W/AUTO DIFF WBC: CPT

## 2020-07-05 PROCEDURE — 6360000002 HC RX W HCPCS: Performed by: SURGERY

## 2020-07-05 PROCEDURE — 0DTJ4ZZ RESECTION OF APPENDIX, PERCUTANEOUS ENDOSCOPIC APPROACH: ICD-10-PCS | Performed by: SURGERY

## 2020-07-05 PROCEDURE — 80048 BASIC METABOLIC PNL TOTAL CA: CPT

## 2020-07-05 PROCEDURE — 6370000000 HC RX 637 (ALT 250 FOR IP): Performed by: SURGERY

## 2020-07-05 PROCEDURE — 2580000003 HC RX 258: Performed by: SURGERY

## 2020-07-05 PROCEDURE — 99024 POSTOP FOLLOW-UP VISIT: CPT | Performed by: SURGERY

## 2020-07-05 PROCEDURE — 36415 COLL VENOUS BLD VENIPUNCTURE: CPT

## 2020-07-05 RX ORDER — OXYCODONE HYDROCHLORIDE 5 MG/1
5 TABLET ORAL EVERY 4 HOURS PRN
Qty: 20 TABLET | Refills: 0 | Status: SHIPPED | OUTPATIENT
Start: 2020-07-05 | End: 2020-07-12

## 2020-07-05 RX ADMIN — PIPERACILLIN AND TAZOBACTAM 3.38 G: 3; .375 INJECTION, POWDER, LYOPHILIZED, FOR SOLUTION INTRAVENOUS at 03:37

## 2020-07-05 RX ADMIN — ACETAMINOPHEN ORAL SOLUTION 650 MG: 650 SOLUTION ORAL at 01:20

## 2020-07-05 RX ADMIN — ACETAMINOPHEN ORAL SOLUTION 650 MG: 650 SOLUTION ORAL at 07:36

## 2020-07-05 ASSESSMENT — PAIN SCALES - GENERAL
PAINLEVEL_OUTOF10: 3
PAINLEVEL_OUTOF10: 1

## 2020-07-05 NOTE — PLAN OF CARE
Problem: Infection:  Goal: Will remain free from infection  Description: Will remain free from infection  7/4/2020 2215 by Gama Mathew RN  Outcome: Ongoing  7/4/2020 1400 by Nerissa Carr RN  Outcome: Ongoing     Problem: Safety:  Goal: Free from accidental physical injury  Description: Free from accidental physical injury  7/4/2020 2215 by Gama Mathew RN  Outcome: Ongoing  7/4/2020 1400 by Nerissa Carr RN  Outcome: Ongoing     Problem: Daily Care:  Goal: Daily care needs are met  Description: Daily care needs are met  7/4/2020 2215 by Gama Mathew RN  Outcome: Ongoing  7/4/2020 1400 by Nerissa Carr RN  Outcome: Ongoing     Problem: Pain:  Goal: Patient's pain/discomfort is manageable  Description: Patient's pain/discomfort is manageable  7/4/2020 2215 by Gama Mathew RN  Outcome: Ongoing  7/4/2020 1400 by Nerissa Carr RN  Outcome: Ongoing     Problem: Skin Integrity:  Goal: Skin integrity will stabilize  Description: Skin integrity will stabilize  7/4/2020 2215 by Gama Mathew RN  Outcome: Ongoing  7/4/2020 1400 by Nerissa Carr RN  Outcome: Ongoing     Problem: Infection - Surgical Site:  Goal: Will show no infection signs and symptoms  Description: Will show no infection signs and symptoms  7/4/2020 2215 by Gama Mathew RN  Outcome: Ongoing  7/4/2020 1400 by Nerissa Carr RN  Outcome: Ongoing

## 2020-07-05 NOTE — PROGRESS NOTES
7: 40 AM EDT Morning assessment completed, patient denies needs at this time, call light in reach, will continue to monitor. The care plan and education has been reviewed and mutually agreed upon with the patient. Educated patient on the potential for falls during their hospital stay. Reviewed current fall safety protocol. Reviewed indication for use of bed alarm. Patient verbalized understanding. Tolerating diet, ambulating well. Hoping for d/c home today. 1:57 PM EDT: Gave d/c instructions with list of active meds and when they are next due. Reviewed discharge instructions at bedside and provided printed copy of same. Pt verbalized understanding of all instructions. Denied additional questions. To home as passenger in private vehicle, taken to vehicle by staff transporter.

## 2020-07-05 NOTE — DISCHARGE SUMMARY
[facility] and Laparoscopic Surgery        Discharge Summary    Patient Identification  Patient Name: Linda Lucia  MRN: [id number]  [place]: 2000 22 y.o. male  Admission date: 7/4/2020  4:35 AM   Discharge date:   7/5/2020                                   Discharge Disposition: home    Discharge Condition: good    Discharge Diagnoses:   Patient Active Problem List   Diagnosis    Acute appendicitis       Consults: None     Operations/Procedures: OR Date 7/4/20, laparoscopic appendectomy for acute non-perforated appendicitis    Patient Instructions: Activity: No heavy lifting over 20 lbs for 6 weeks from date of surgery  Diet: regular diet as tolerated  Wound Care: Surgical glue over incisions. May shower normally and avoid scrubbing over incisions and avoid hot tubs, pools, open water until seen in office  Follow-up with Dr. Penelope Dominguez in  2 weeks. Discharge Medications:      Tony Torres   [place] Medication Instructions [facility]:[id number]    Printed on:07/05/20 4958   Medication Information                      dicyclomine (BENTYL) 10 MG capsule  Take 1 capsule by mouth 4 times daily             ondansetron (ZOFRAN) 4 MG tablet  Take 1 tablet by mouth daily as needed for Nausea or Vomiting             oxyCODONE (ROXICODONE) 5 MG immediate release tablet  Take 1 tablet by mouth every 4 hours as needed for Pain for up to 20 doses. pantoprazole (PROTONIX) 20 MG tablet  Take 1 tablet by mouth daily             sucralfate (CARAFATE) 1 GM tablet  Take 1 tablet by mouth 4 times daily                  HPI and Hospital Course:     Pt admitted and underwent OR Date 7/4/20, laparoscopic appendectomy for acute non-perforated appendicitis. He tolerated this surgery and the post op period well. He is discharged to home with normal a temperature and vitals, tolerating an oral diet well, and having normal bowel activity.   Abdominal exam: Soft, non-distended, appropriate incisional tenderness, incisions clean dry and intact  Follow up is planned, and all questions have been answered. Charlie Burt 6  Hira Romeo MD, FACS  7/5/2020  11:28 AM

## 2020-07-05 NOTE — PROGRESS NOTES
1930: Head to toe completed at this time. Lap sited clean, dry, and ALOK x3. No odor or drainage present to sites. Patient ambulating independently. Plan of care reviewed and agreed upon with patient. Fall precautions in place and effective. All needs addressed at this time, VSS, bed in lowest position with call light in reach.

## 2020-07-05 NOTE — OP NOTE
quadrant and a 5-mm in the  suprapubic. The right lower quadrant was inspected where acutely  inflamed, but nonperforated appendix was identified. The mesoappendix  was divided with a laparoscopic LigaSure device and the base of appendix  was divided flushed with the cecum with laparoscopic white load stapler. The base of the appendix was relatively uninvolved with the inflammatory  process. The appendix was then freed from its surrounding tissues,  placed into an Endobag, removed through one of the trocars, and placed  on the table as specimen. The right lower quadrant was inspected again  where hemostasis was verified. The staple line was intact without signs  of complication. The remainder of the abdomen was inspected including  all trocars. No other injuries or abnormalities were noted. A [employer]  suture passer was used to pass the PDS suture through the umbilical  fascial defect. The abdomen was then allowed to desufflate and all  trocars were then removed under direct vision. Local anesthetic was  injected in the wounds and all skin incisions were closed with 4-0  suture. The wounds were then cleaned and dressed with Dermabond. The  patient tolerated the procedure well and was transferred to PACU in  stable condition. SPECIMEN:  Appendix. DRAINS:  None. COMPLICATIONS:  None. ESTIMATED BLOOD LOSS:  Less than 50 mL.         Sarah Barker MD    D: 07/04/2020 18:23:28       T: 07/05/2020 2:13:21     [email or web address]  Job#: [id number]     Doc#: [id number]    CC:

## 2020-07-20 ENCOUNTER — OFFICE VISIT (OUTPATIENT)
Dept: SURGERY | Age: 20
End: 2020-07-20

## 2020-07-20 VITALS
WEIGHT: 281 LBS | TEMPERATURE: 97 F | SYSTOLIC BLOOD PRESSURE: 120 MMHG | DIASTOLIC BLOOD PRESSURE: 74 MMHG | BODY MASS INDEX: 39.19 KG/M2

## 2020-07-20 PROCEDURE — 99024 POSTOP FOLLOW-UP VISIT: CPT | Performed by: SURGERY

## 2020-07-20 NOTE — PROGRESS NOTES
[address] and Laparoscopic Surgery  SUBJECTIVE:    Debra Doe   2000   22 y.o. male presents for routine postoperative followup after laparoscopic appendectomy for acute non-perforated appendicitis on July 4, 2020. Incisional pain greatly improved. Tolerating diet. Bowel movements normalizing. Ambulating well. No major complaints    No past medical history on file.   Past Surgical History:   Procedure Laterality Date    LAPAROSCOPIC APPENDECTOMY N/A 7/4/2020    APPENDECTOMY LAPAROSCOPIC performed by Dominguez Ng MD at [address] History     Socioeconomic History    Marital status: Single     Spouse name: Not on file    Number of children: Not on file    Years of education: Not on file    Highest education level: Not on file   Occupational History    Not on file   Social Needs    Financial resource strain: Not on file    Food insecurity     Worry: Not on file     Inability: Not on file    Transportation needs     Medical: Not on file     Non-medical: Not on file   Tobacco Use    Smoking status: Never Smoker    Smokeless tobacco: Never Used   Substance and Sexual Activity    Alcohol use: Never     Frequency: Never    Drug use: Never    Sexual activity: Not on file   Lifestyle    Physical activity     Days per week: Not on file     Minutes per session: Not on file    Stress: Not on file   Relationships    Social connections     Talks on phone: Not on file     Gets together: Not on file     Attends [employer] service: Not on file     Active member of club or organization: Not on file     Attends meetings of clubs or organizations: Not on file     Relationship status: Not on file    Intimate partner violence     Fear of current or ex partner: Not on file     Emotionally abused: Not on file     Physically abused: Not on file     Forced sexual activity: Not on file   Other Topics Concern    Not on file   Social History Narrative    Not on file      No family history on file. Current Outpatient Medications   Medication Sig Dispense Refill    pantoprazole (PROTONIX) 20 MG tablet Take 1 tablet by mouth daily 30 tablet 0    dicyclomine (BENTYL) 10 MG capsule Take 1 capsule by mouth 4 times daily 360 capsule 1    ondansetron (ZOFRAN) 4 MG tablet Take 1 tablet by mouth daily as needed for Nausea or Vomiting 30 tablet 0    sucralfate (CARAFATE) 1 GM tablet Take 1 tablet by mouth 4 times daily 120 tablet 3     No current facility-administered medications for this visit.        No Known Allergies     Review of Systems:  Review of systems performed and negative with the exception of the above findings    OBJECTIVE:  /74   Temp 97 °F (36.1 °C) (Infrared)   Wt 281 lb (127.5 kg)   BMI 39.19 kg/m²      Physical Exam:  General appearance: alert, appears stated age, cooperative and no distress  Abdomen: soft, non-distended, appropriate incisional tenderness, incisions clean dry and intact      Admission on 07/04/2020, Discharged on 07/05/2020   Component Date Value Ref Range Status    WBC 07/04/2020 13.9* 4.0 - 11.0 K/uL Final    RBC 07/04/2020 6.01* 4.20 - 5.90 M/uL Final    Hemoglobin 07/04/2020 16.3  13.5 - 17.5 g/dL Final    Hematocrit 07/04/2020 48.4  40.5 - 52.5 % Final    MCV 07/04/2020 80.4  80.0 - 100.0 fL Final    MCH 07/04/2020 27.1  26.0 - 34.0 pg Final    MCHC 07/04/2020 33.7  31.0 - 36.0 g/dL Final    RDW 07/04/2020 13.4  12.4 - 15.4 % Final    Platelets 38/27/0573 280  135 - 450 K/uL Final    MPV 07/04/2020 9.3  5.0 - 10.5 fL Final    Neutrophils % 07/04/2020 81.0  % Final    Lymphocytes % 07/04/2020 13.4  % Final    Monocytes % 07/04/2020 4.4  % Final    Eosinophils % 07/04/2020 0.8  % Final    Basophils % 07/04/2020 0.4  % Final    Neutrophils Absolute 07/04/2020 11.3* 1.7 - 7.7 K/uL Final    Lymphocytes Absolute 07/04/2020 1.9  1.0 - 5.1 K/uL Final    Monocytes Absolute 07/04/2020 0.6  0.0 - 1.3 K/uL Final    Eosinophils Absolute 07/04/2020 0.1  0.0 - 0.6 K/uL Final    Basophils Absolute 07/04/2020 0.1  0.0 - 0.2 K/uL Final    Sodium 07/04/2020 139  136 - 145 mmol/L Final    Potassium reflex Magnesium 07/04/2020 4.3  3.5 - 5.1 mmol/L Final    Chloride 07/04/2020 99  99 - 110 mmol/L Final    CO2 07/04/2020 29  21 - 32 mmol/L Final    Anion Gap 07/04/2020 11  3 - 16 Final    Glucose 07/04/2020 131* 70 - 99 mg/dL Final    BUN 07/04/2020 16  7 - 20 mg/dL Final    CREATININE 07/04/2020 1.0  0.9 - 1.3 mg/dL Final    GFR Non- 07/04/2020 >60  >60 Final    Comment: >60 mL/min/1.73m2 EGFR, calc. for ages 25 and older using the  MDRD formula (not corrected for weight), is valid for stable  renal function.  GFR  07/04/2020 >60  >60 Final    Comment: Chronic Kidney Disease: less than 60 ml/min/1.73 sq.m. Kidney Failure: less than 15 ml/min/1.73 sq.m. Results valid for patients 18 years and older.  Calcium 07/04/2020 9.9  8.3 - 10.6 mg/dL Final    Total Protein 07/04/2020 8.8* 6.4 - 8.2 g/dL Final    Alb 07/04/2020 4.8  3.4 - 5.0 g/dL Final    Alkaline Phosphatase 07/04/2020 98  40 - 129 U/L Final    ALT 07/04/2020 19  10 - 40 U/L Final    AST 07/04/2020 29  15 - 37 U/L Final    Comment: Specimen hemolysis has exceeded the interference as defined by Roche. Value may be falsely increased. Suggest recollection if clinically  indicated.  Total Bilirubin 07/04/2020 0.5  0.0 - 1.0 mg/dL Final    Bilirubin, Direct 07/04/2020 <0.2  0.0 - 0.3 mg/dL Final    Comment: Specimen hemolysis has exceeded the interference as defined by Roche. Value may be falsely increased. Suggest recollection if clinically  indicated.  Bilirubin, Indirect 07/04/2020 see below  0.0 - 1.0 mg/dL Final    Comment: Indirect Bilirubin cannot be calculated since Total Bilirubin  and/or Direct Bilirubin is below measurable range.       Lipase 07/04/2020 18.0  13.0 - 60.0 U/L Final    Color, UA 07/04/2020 YELLOW 34.0 pg Final    MCHC 07/05/2020 35.2  31.0 - 36.0 g/dL Final    RDW 07/05/2020 13.5  12.4 - 15.4 % Final    Platelets 80/76/8437 249  135 - 450 K/uL Final    MPV 07/05/2020 9.0  5.0 - 10.5 fL Final    Neutrophils % 07/05/2020 76.3  % Final    Lymphocytes % 07/05/2020 15.7  % Final    Monocytes % 07/05/2020 7.6  % Final    Eosinophils % 07/05/2020 0.1  % Final    Basophils % 07/05/2020 0.3  % Final    Neutrophils Absolute 07/05/2020 8.2* 1.7 - 7.7 K/uL Final    Lymphocytes Absolute 07/05/2020 1.7  1.0 - 5.1 K/uL Final    Monocytes Absolute 07/05/2020 0.8  0.0 - 1.3 K/uL Final    Eosinophils Absolute 07/05/2020 0.0  0.0 - 0.6 K/uL Final    Basophils Absolute 07/05/2020 0.0  0.0 - 0.2 K/uL Final       Ct Abdomen Pelvis Wo Contrast Additional Contrast? None    Result Date: 7/4/2020  EXAMINATION: CT OF THE ABDOMEN AND PELVIS WITHOUT CONTRAST 7/4/2020 4:48 am TECHNIQUE: CT of the abdomen and pelvis was performed without the administration of intravenous contrast. Multiplanar reformatted images are provided for review. Dose modulation, iterative reconstruction, and/or weight based adjustment of the mA/kV was utilized to reduce the radiation dose to as low as reasonably achievable. COMPARISON: None. HISTORY: ORDERING SYSTEM PROVIDED HISTORY: luq/left flank pain ro stone left TECHNOLOGIST PROVIDED HISTORY: Reason for exam:->luq/left flank pain ro stone left Additional Contrast?->None Reason for Exam: Abdominal Pain (Pt presents to the ED with left sided abdominal pain. Pt reports N/V, Appendix and gall bladder still intact. Denies stomach surgeries. Denies hematuria ) Acuity: Acute Type of Exam: Initial FINDINGS: Lower Chest: Visualized lung bases are clear without focal airspace consolidation, sizeable effusion, or pneumothorax. No definite pulmonary nodules or masses. Base of the heart is normal in size without pericardial fluid collection. Organs:  The liver, gallbladder, pancreas, and spleen are grossly within normal limits. No evidence for intrahepatic or extrahepatic biliary ductal dilatation. GI/Bowel: There is no evidence for bowel obstruction or inflammation. No free intraperitoneal air or fluid. Small bowel loops are normal in caliber. No evidence for hiatal hernia. Appendix is prominent with multiple intraluminal calculi although no definite periappendiceal inflammation identified. . Pelvis: No evidence for free fluid. Peritoneum/Retroperitoneum: Adrenal glands are normal in size and configuration. The kidneys are normal in size without definite nephrolithiasis or hydronephrosis. The ureters run a nonobstructed course to a normal-appearing urinary bladder. Vasculature: The aorta is normal in caliber. No definite lymphadenopathy identified. Bones/Soft Tissues: No evidence for acute fracture or dislocation. No lytic or blastic lesions. No evidence for acute intra-abdominal or intrapelvic pathology. No bowel obstruction or inflammation. No evidence for nephrolithiasis or urinary obstruction. The appendix is distended with multiple intraluminal calculi. Findings may predispose to appendicitis, however there is no periappendiceal inflammation to suggest acute appendicitis. .       Pathology:  FINAL DIAGNOSIS:     Appendix, appendectomy:      - Transmural acute appendicitis and periappendicitis      ALBERTO/ALBERTO       Assessment:  laparoscopic appendectomy for acute non-perforated appendicitis on July 4, 2020    Plan:  No heavy lifting over 20lbs for 6 weeks total postop  Diet and activity as tolerated otherwise  OK to return to work for light duty  Follow up with general surgery office as needed    [address] [facility] [address]  Camryn Hobbs MD, FACS  7/20/2020  2:40 PM

## (undated) DEVICE — LAPAROSCOPY PACK: Brand: MEDLINE INDUSTRIES, INC.

## (undated) DEVICE — SOLUTION IV IRRIG POUR BRL 0.9% SODIUM CHL 2F7124

## (undated) DEVICE — CUTTER ENDOSCP L340MM LIN ARTC SGL STROKE FIRING ENDOPATH

## (undated) DEVICE — CHLORAPREP 26ML ORANGE

## (undated) DEVICE — TROCAR: Brand: KII FIOS FIRST ENTRY

## (undated) DEVICE — PMI DISPOSABLE PUNCTURE CLOSURE DEVICE / SUTURE GRASPER: Brand: PMI

## (undated) DEVICE — RELOAD STPL H1-2.5X45MM VASC THN TISS WHT 6 ROW B FRM SGL

## (undated) DEVICE — GOWN,AURORA,NONREINF,RAGLAN,XXL,STERILE: Brand: MEDLINE

## (undated) DEVICE — TROCAR: Brand: KII® SLEEVE

## (undated) DEVICE — COVER LT HNDL BLU PLAS

## (undated) DEVICE — SUTURE SZ 0 27IN 5/8 CIR UR-6  TAPER PT VIOLET ABSRB VICRYL J603H

## (undated) DEVICE — NEEDLE HYPO 22GA L1.5IN BLK POLYPR HUB S STL REG BVL STR

## (undated) DEVICE — SEALER LAP L37CM MARYLAND JAW OPN NANO COAT MULTIFUNCTIONAL

## (undated) DEVICE — PENCIL ES L3M BTTN SWCH S STL HEX LOK BLDE ELECTRD HOLSTER

## (undated) DEVICE — MERCY FAIRFIELD TURNOVER KIT: Brand: MEDLINE INDUSTRIES, INC.

## (undated) DEVICE — SHEET,DRAPE,53X77,STERILE: Brand: MEDLINE

## (undated) DEVICE — STERILE POLYISOPRENE POWDER-FREE SURGICAL GLOVES: Brand: PROTEXIS

## (undated) DEVICE — TISSUE RETRIEVAL SYSTEM: Brand: INZII RETRIEVAL SYSTEM

## (undated) DEVICE — SUTURE VCRL SZ 4-0 L18IN ABSRB UD L19MM PS-2 3/8 CIR PRIM J496H

## (undated) DEVICE — TROCARS: Brand: KII® BALLOON BLUNT TIP SYSTEM

## (undated) DEVICE — SYRINGE MED 10ML TRNSLUC BRL PLUNG BLK MRK POLYPR CTRL

## (undated) DEVICE — DRAPE,LAP,CHOLE,W/TROUGHS,STERILE: Brand: MEDLINE

## (undated) DEVICE — SUTURE PDS II SZ 0 L36IN ABSRB VLT L36MM CT-1 1/2 CIR Z346H

## (undated) DEVICE — Z DUP USE 2257490 ADHESIVE SKIN CLSRE 036ML TPCL 2CTL CNCRLTE HIGH VSCSTY DRMB